# Patient Record
Sex: MALE | Race: WHITE | NOT HISPANIC OR LATINO
[De-identification: names, ages, dates, MRNs, and addresses within clinical notes are randomized per-mention and may not be internally consistent; named-entity substitution may affect disease eponyms.]

---

## 2019-12-31 ENCOUNTER — TRANSCRIPTION ENCOUNTER (OUTPATIENT)
Age: 54
End: 2019-12-31

## 2020-03-18 ENCOUNTER — TRANSCRIPTION ENCOUNTER (OUTPATIENT)
Age: 55
End: 2020-03-18

## 2020-03-20 ENCOUNTER — EMERGENCY (EMERGENCY)
Facility: HOSPITAL | Age: 55
LOS: 0 days | Discharge: HOME | End: 2020-03-21
Attending: EMERGENCY MEDICINE | Admitting: EMERGENCY MEDICINE
Payer: COMMERCIAL

## 2020-03-20 VITALS
HEART RATE: 70 BPM | OXYGEN SATURATION: 99 % | SYSTOLIC BLOOD PRESSURE: 115 MMHG | RESPIRATION RATE: 18 BRPM | DIASTOLIC BLOOD PRESSURE: 75 MMHG

## 2020-03-20 VITALS
DIASTOLIC BLOOD PRESSURE: 66 MMHG | RESPIRATION RATE: 18 BRPM | WEIGHT: 190.04 LBS | HEART RATE: 150 BPM | SYSTOLIC BLOOD PRESSURE: 125 MMHG | OXYGEN SATURATION: 99 % | TEMPERATURE: 96 F

## 2020-03-20 DIAGNOSIS — I48.91 UNSPECIFIED ATRIAL FIBRILLATION: ICD-10-CM

## 2020-03-20 DIAGNOSIS — R00.2 PALPITATIONS: ICD-10-CM

## 2020-03-20 DIAGNOSIS — Z88.8 ALLERGY STATUS TO OTHER DRUGS, MEDICAMENTS AND BIOLOGICAL SUBSTANCES STATUS: ICD-10-CM

## 2020-03-20 LAB
ALBUMIN SERPL ELPH-MCNC: 4.4 G/DL — SIGNIFICANT CHANGE UP (ref 3.5–5.2)
ALP SERPL-CCNC: 44 U/L — SIGNIFICANT CHANGE UP (ref 30–115)
ALT FLD-CCNC: 14 U/L — SIGNIFICANT CHANGE UP (ref 0–41)
ANION GAP SERPL CALC-SCNC: 13 MMOL/L — SIGNIFICANT CHANGE UP (ref 7–14)
AST SERPL-CCNC: 17 U/L — SIGNIFICANT CHANGE UP (ref 0–41)
BILIRUB SERPL-MCNC: 1 MG/DL — SIGNIFICANT CHANGE UP (ref 0.2–1.2)
BUN SERPL-MCNC: 15 MG/DL — SIGNIFICANT CHANGE UP (ref 10–20)
CALCIUM SERPL-MCNC: 9.8 MG/DL — SIGNIFICANT CHANGE UP (ref 8.5–10.1)
CHLORIDE SERPL-SCNC: 105 MMOL/L — SIGNIFICANT CHANGE UP (ref 98–110)
CO2 SERPL-SCNC: 25 MMOL/L — SIGNIFICANT CHANGE UP (ref 17–32)
CREAT SERPL-MCNC: 1.2 MG/DL — SIGNIFICANT CHANGE UP (ref 0.7–1.5)
GLUCOSE SERPL-MCNC: 77 MG/DL — SIGNIFICANT CHANGE UP (ref 70–99)
HCT VFR BLD CALC: 44.2 % — SIGNIFICANT CHANGE UP (ref 42–52)
HGB BLD-MCNC: 15.8 G/DL — SIGNIFICANT CHANGE UP (ref 14–18)
MCHC RBC-ENTMCNC: 32 PG — HIGH (ref 27–31)
MCHC RBC-ENTMCNC: 35.7 G/DL — SIGNIFICANT CHANGE UP (ref 32–37)
MCV RBC AUTO: 89.7 FL — SIGNIFICANT CHANGE UP (ref 80–94)
NRBC # BLD: 0 /100 WBCS — SIGNIFICANT CHANGE UP (ref 0–0)
NT-PROBNP SERPL-SCNC: 54 PG/ML — SIGNIFICANT CHANGE UP (ref 0–300)
PLATELET # BLD AUTO: 276 K/UL — SIGNIFICANT CHANGE UP (ref 130–400)
POTASSIUM SERPL-MCNC: 3.8 MMOL/L — SIGNIFICANT CHANGE UP (ref 3.5–5)
POTASSIUM SERPL-SCNC: 3.8 MMOL/L — SIGNIFICANT CHANGE UP (ref 3.5–5)
PROT SERPL-MCNC: 6.8 G/DL — SIGNIFICANT CHANGE UP (ref 6–8)
RBC # BLD: 4.93 M/UL — SIGNIFICANT CHANGE UP (ref 4.7–6.1)
RBC # FLD: 12.9 % — SIGNIFICANT CHANGE UP (ref 11.5–14.5)
SODIUM SERPL-SCNC: 143 MMOL/L — SIGNIFICANT CHANGE UP (ref 135–146)
TROPONIN T SERPL-MCNC: <0.01 NG/ML — SIGNIFICANT CHANGE UP
WBC # BLD: 6.23 K/UL — SIGNIFICANT CHANGE UP (ref 4.8–10.8)
WBC # FLD AUTO: 6.23 K/UL — SIGNIFICANT CHANGE UP (ref 4.8–10.8)

## 2020-03-20 PROCEDURE — 99285 EMERGENCY DEPT VISIT HI MDM: CPT

## 2020-03-20 PROCEDURE — 93010 ELECTROCARDIOGRAM REPORT: CPT | Mod: 76

## 2020-03-20 RX ORDER — PROCAINAMIDE HCL 500 MG
1000 TABLET, EXTENDED RELEASE ORAL ONCE
Refills: 0 | Status: COMPLETED | OUTPATIENT
Start: 2020-03-20 | End: 2020-03-20

## 2020-03-20 RX ORDER — APIXABAN 2.5 MG/1
1 TABLET, FILM COATED ORAL
Qty: 28 | Refills: 0
Start: 2020-03-20 | End: 2020-04-02

## 2020-03-20 RX ORDER — MAGNESIUM SULFATE 500 MG/ML
2 VIAL (ML) INJECTION ONCE
Refills: 0 | Status: COMPLETED | OUTPATIENT
Start: 2020-03-20 | End: 2020-03-20

## 2020-03-20 RX ORDER — SODIUM CHLORIDE 9 MG/ML
1000 INJECTION, SOLUTION INTRAVENOUS ONCE
Refills: 0 | Status: COMPLETED | OUTPATIENT
Start: 2020-03-20 | End: 2020-03-20

## 2020-03-20 RX ADMIN — Medication 520 MILLIGRAM(S): at 22:12

## 2020-03-20 RX ADMIN — Medication 2 GRAM(S): at 23:07

## 2020-03-20 RX ADMIN — Medication 50 GRAM(S): at 22:45

## 2020-03-20 RX ADMIN — Medication 1000 MILLIGRAM(S): at 22:45

## 2020-03-20 RX ADMIN — SODIUM CHLORIDE 1000 MILLILITER(S): 9 INJECTION, SOLUTION INTRAVENOUS at 22:29

## 2020-03-20 NOTE — ED PROVIDER NOTE - OBJECTIVE STATEMENT
55 y M no pmh pw palpitations. Sudden onset palpitations about 2 hours prior to arrival. Associated with lightheadedness. No hx of palpitations, not on blood thinners. Denies cp, sob, abd pain, back pain, dizziness.

## 2020-03-20 NOTE — ED PROVIDER NOTE - PHYSICAL EXAMINATION
CONSTITUTIONAL: Well-developed; well-nourished; in no acute distress.   SKIN: warm, dry  HEAD: Normocephalic; atraumatic.  EYES: PERRL, EOMI, normal sclera and conjunctiva   ENT: No nasal discharge; airway clear.  NECK: Supple; non tender.  CARD: S1, S2 normal; no murmurs, gallops, or rubs. Irregular, tachycardic.   RESP: No wheezes, rales or rhonchi.  ABD: soft ntnd  EXT: Normal ROM.  No clubbing, cyanosis or edema.   LYMPH: No acute cervical adenopathy.  NEURO: Alert, oriented, grossly unremarkable  PSYCH: Cooperative, appropriate.

## 2020-03-20 NOTE — ED PROVIDER NOTE - PATIENT PORTAL LINK FT
You can access the FollowMyHealth Patient Portal offered by [facility] by registering at the following website: [email or web address]. By joining [employer]’s FollowMyHealth portal, you will also be able to view your health information using other applications (apps) compatible with our system.

## 2020-03-20 NOTE — ED PROVIDER NOTE - ATTENDING CONTRIBUTION TO CARE
55 M to ED with new onset Afib  at 9pm tonight, noted to have rapid HR  h/o HTN and sleep apnea w/ cpap  intermittent palpitations and this pm persistent sx so to ED.  AVSS, exam as noted, CTAB, IRRR, abdomen soft NTND, (+)

## 2020-03-20 NOTE — ED PROVIDER NOTE - CARE PROVIDER_API CALL
[employer]; YOLIS)  Cardiac Electrophysiology  [address] [place] [address]  Phone: [phone]  Fax: [phone]  Follow Up Time:

## 2020-03-21 PROCEDURE — 71045 X-RAY EXAM CHEST 1 VIEW: CPT | Mod: 26

## 2020-03-24 PROBLEM — Z00.00 ENCOUNTER FOR PREVENTIVE HEALTH EXAMINATION: Status: ACTIVE | Noted: 2020-03-24

## 2020-06-04 ENCOUNTER — APPOINTMENT (OUTPATIENT)
Dept: CARDIOLOGY | Facility: CLINIC | Age: 55
End: 2020-06-04
Payer: COMMERCIAL

## 2020-06-04 VITALS
HEIGHT: 69 IN | TEMPERATURE: 97.7 F | WEIGHT: 190 LBS | HEART RATE: 59 BPM | SYSTOLIC BLOOD PRESSURE: 120 MMHG | DIASTOLIC BLOOD PRESSURE: 79 MMHG | BODY MASS INDEX: 28.14 KG/M2

## 2020-06-04 PROCEDURE — 93000 ELECTROCARDIOGRAM COMPLETE: CPT

## 2020-06-04 PROCEDURE — 99204 OFFICE O/P NEW MOD 45 MIN: CPT

## 2020-06-04 NOTE — PHYSICAL EXAM
[General Appearance - Well Developed] : well developed [Normal Appearance] : normal appearance [General Appearance - Well Nourished] : well nourished [Well Groomed] : well groomed [No Deformities] : no deformities [Normal Conjunctiva] : the conjunctiva exhibited no abnormalities [General Appearance - In No Acute Distress] : no acute distress [Eyelids - No Xanthelasma] : the eyelids demonstrated no xanthelasmas [Murmurs] : no murmurs present [Heart Sounds] : normal S1 and S2 [Heart Rate And Rhythm] : heart rate and rhythm were normal [Respiration, Rhythm And Depth] : normal respiratory rhythm and effort [Exaggerated Use Of Accessory Muscles For Inspiration] : no accessory muscle use [Auscultation Breath Sounds / Voice Sounds] : lungs were clear to auscultation bilaterally [Nail Clubbing] : no clubbing of the fingernails [Cyanosis, Localized] : no localized cyanosis [Petechial Hemorrhages (___cm)] : no petechial hemorrhages [] : no ischemic changes

## 2020-06-04 NOTE — HISTORY OF PRESENT ILLNESS
[FreeTextEntry1] : Patient with history of FRANKY, HTM new onset PAF CHADVASC 1. Patient hope to episodes of palpitations recently. He also describes occasional palpitations in the past. Patient went to emergency room found to be in atrial fibrillation. He spontaneously converted to sinus rhythm and was placed on anticoagulation. Patient denies syncope chest pain nausea vomiting. Patient is using his mask for obstructive sleep. Patient of the office for further evaluation of his event.\par \par EKG SR n

## 2020-06-04 NOTE — ASSESSMENT
[FreeTextEntry1] : new onset AF\par \par - event monitor to assess AF burden\par - nuclear strees test\par - echo\par - cont AC for now. Discussed risk an befits\par - cont FRANKY mask

## 2020-06-19 ENCOUNTER — RESULT REVIEW (OUTPATIENT)
Age: 55
End: 2020-06-19

## 2020-06-19 ENCOUNTER — OUTPATIENT (OUTPATIENT)
Dept: OUTPATIENT SERVICES | Facility: HOSPITAL | Age: 55
LOS: 1 days | Discharge: HOME | End: 2020-06-19
Payer: COMMERCIAL

## 2020-06-19 DIAGNOSIS — I48.0 PAROXYSMAL ATRIAL FIBRILLATION: ICD-10-CM

## 2020-06-19 PROCEDURE — 78452 HT MUSCLE IMAGE SPECT MULT: CPT | Mod: 26

## 2020-07-17 LAB
T3FREE SERPL-MCNC: 2.69 PG/ML
T4 FREE SERPL-MCNC: 1.2 NG/DL
TSH SERPL-ACNC: 2.07 UIU/ML

## 2020-08-21 ENCOUNTER — APPOINTMENT (OUTPATIENT)
Dept: CARDIOLOGY | Facility: CLINIC | Age: 55
End: 2020-08-21
Payer: COMMERCIAL

## 2020-08-21 PROCEDURE — 93306 TTE W/DOPPLER COMPLETE: CPT

## 2020-09-04 ENCOUNTER — APPOINTMENT (OUTPATIENT)
Dept: CARDIOLOGY | Facility: CLINIC | Age: 55
End: 2020-09-04
Payer: COMMERCIAL

## 2020-09-04 VITALS
WEIGHT: 190 LBS | DIASTOLIC BLOOD PRESSURE: 91 MMHG | HEIGHT: 69 IN | SYSTOLIC BLOOD PRESSURE: 149 MMHG | TEMPERATURE: 97.4 F | BODY MASS INDEX: 28.14 KG/M2 | HEART RATE: 58 BPM

## 2020-09-04 DIAGNOSIS — Z78.9 OTHER SPECIFIED HEALTH STATUS: ICD-10-CM

## 2020-09-04 DIAGNOSIS — Z87.891 PERSONAL HISTORY OF NICOTINE DEPENDENCE: ICD-10-CM

## 2020-09-04 DIAGNOSIS — I10 ESSENTIAL (PRIMARY) HYPERTENSION: ICD-10-CM

## 2020-09-04 PROCEDURE — 99214 OFFICE O/P EST MOD 30 MIN: CPT

## 2020-09-04 PROCEDURE — 93000 ELECTROCARDIOGRAM COMPLETE: CPT

## 2020-09-04 RX ORDER — APIXABAN 5 MG/1
5 TABLET, FILM COATED ORAL
Qty: 180 | Refills: 3 | Status: DISCONTINUED | COMMUNITY
Start: 1900-01-01 | End: 2020-09-04

## 2020-09-04 NOTE — ASSESSMENT
[FreeTextEntry1] : New AF - no AF on event monior \par - all tests reviewed\par - normal labs\par - cont to monitor\par - CHADVSC 1; discussed with patients risk an befits. Pt want tot stop it\par - cont ASA

## 2020-09-04 NOTE — HISTORY OF PRESENT ILLNESS
[FreeTextEntry1] : Patient with history of FRANKY, HTM new onset PAF CHADVASC 1. Patient hope to episodes of palpitations recently. He also describes occasional palpitations in the past. Patient went to emergency room found to be in atrial fibrillation. He spontaneously converted to sinus rhythm and was placed on anticoagulation. Patient denies syncope chest pain nausea vomiting. Patient is using his mask for obstructive sleep. Patient of the office for further evaluation of his event.\par \par EKG SR 55 bpm\par event monitor- PAC and rare PVC\par echo EF 55%\par nuc - normal

## 2020-09-04 NOTE — PHYSICAL EXAM
[Normal Appearance] : normal appearance [General Appearance - Well Developed] : well developed [Well Groomed] : well groomed [General Appearance - Well Nourished] : well nourished [No Deformities] : no deformities [General Appearance - In No Acute Distress] : no acute distress [Eyelids - No Xanthelasma] : the eyelids demonstrated no xanthelasmas [Normal Conjunctiva] : the conjunctiva exhibited no abnormalities [Respiration, Rhythm And Depth] : normal respiratory rhythm and effort [Exaggerated Use Of Accessory Muscles For Inspiration] : no accessory muscle use [Auscultation Breath Sounds / Voice Sounds] : lungs were clear to auscultation bilaterally [Heart Rate And Rhythm] : heart rate and rhythm were normal [Murmurs] : no murmurs present [Heart Sounds] : normal S1 and S2 [Nail Clubbing] : no clubbing of the fingernails [Petechial Hemorrhages (___cm)] : no petechial hemorrhages [Cyanosis, Localized] : no localized cyanosis [] : no ischemic changes

## 2021-06-04 ENCOUNTER — APPOINTMENT (OUTPATIENT)
Dept: CARDIOLOGY | Facility: CLINIC | Age: 56
End: 2021-06-04

## 2021-07-02 ENCOUNTER — APPOINTMENT (OUTPATIENT)
Dept: CARDIOLOGY | Facility: CLINIC | Age: 56
End: 2021-07-02
Payer: COMMERCIAL

## 2021-07-02 VITALS — TEMPERATURE: 97.9 F | SYSTOLIC BLOOD PRESSURE: 134 MMHG | HEART RATE: 60 BPM | DIASTOLIC BLOOD PRESSURE: 84 MMHG

## 2021-07-02 DIAGNOSIS — I48.0 PAROXYSMAL ATRIAL FIBRILLATION: ICD-10-CM

## 2021-07-02 PROCEDURE — 99213 OFFICE O/P EST LOW 20 MIN: CPT

## 2021-07-02 PROCEDURE — 93000 ELECTROCARDIOGRAM COMPLETE: CPT

## 2021-07-02 NOTE — ASSESSMENT
[FreeTextEntry1] : New AF - no AF on event monitor no new epsides \par - cont to monitor\par - CHADVSC 1; discussed with patients risk an befits. Pt want tot stop it\par - cont ASA

## 2021-07-02 NOTE — PHYSICAL EXAM
[General Appearance - Well Developed] : well developed [Normal Appearance] : normal appearance [Well Groomed] : well groomed [General Appearance - Well Nourished] : well nourished [No Deformities] : no deformities [General Appearance - In No Acute Distress] : no acute distress [Normal Conjunctiva] : the conjunctiva exhibited no abnormalities [Eyelids - No Xanthelasma] : the eyelids demonstrated no xanthelasmas [Respiration, Rhythm And Depth] : normal respiratory rhythm and effort [Auscultation Breath Sounds / Voice Sounds] : lungs were clear to auscultation bilaterally [Exaggerated Use Of Accessory Muscles For Inspiration] : no accessory muscle use [Heart Rate And Rhythm] : heart rate and rhythm were normal [Heart Sounds] : normal S1 and S2 [Murmurs] : no murmurs present [Nail Clubbing] : no clubbing of the fingernails [Petechial Hemorrhages (___cm)] : no petechial hemorrhages [Cyanosis, Localized] : no localized cyanosis [] : no ischemic changes

## 2021-07-02 NOTE — HISTORY OF PRESENT ILLNESS
[FreeTextEntry1] : Patient with history of FRANKY, HTM new onset PAF CHADVASC 1. Patient hope to episodes of palpitations recently. He also describes occasional palpitations in the past. Patient went to emergency room found to be in atrial fibrillation. He spontaneously converted to sinus rhythm and was placed on anticoagulation. Patient denies syncope chest pain nausea vomiting. Patient is using his mask for obstructive sleep. Patient of the office for further evaluation of his event.\par \par No palpitations, pt is exercising. \par \par EKG SR 60 bpm\par event monitor- PAC and rare PVC\par echo EF 55%\par nuc - normal

## 2021-10-06 ENCOUNTER — TRANSCRIPTION ENCOUNTER (OUTPATIENT)
Age: 56
End: 2021-10-06

## 2021-10-06 PROBLEM — I10 ESSENTIAL HYPERTENSION: Status: ACTIVE | Noted: 2020-09-04

## 2021-12-10 ENCOUNTER — TRANSCRIPTION ENCOUNTER (OUTPATIENT)
Age: 56
End: 2021-12-10

## 2021-12-19 ENCOUNTER — TRANSCRIPTION ENCOUNTER (OUTPATIENT)
Age: 56
End: 2021-12-19

## 2022-03-02 ENCOUNTER — APPOINTMENT (OUTPATIENT)
Dept: GASTROENTEROLOGY | Facility: CLINIC | Age: 57
End: 2022-03-02
Payer: COMMERCIAL

## 2022-03-02 DIAGNOSIS — K63.5 POLYP OF COLON: ICD-10-CM

## 2022-03-02 PROCEDURE — 99443: CPT

## 2022-03-02 NOTE — HISTORY OF PRESENT ILLNESS
[Home] : at home, [unfilled] , at the time of the visit. [Medical Office: ([facility])___] : at the medical office located in  [Verbal consent obtained from patient] : the patient, [unfilled] [FreeTextEntry4] : Cassidy [de-identified] : Patient  is a 57-year-old gentleman with past medical history of hypertension, prior ERCP for cholangitis done in 2014, and prior hyperplastic polyps who presents for set up for colonoscopy.  Patient currently has been feeling well but has been due for colonoscopy since 2020.  Patient does take baby aspirin once a day.

## 2022-03-02 NOTE — ASSESSMENT
[FreeTextEntry1] : Patient  is a 57-year-old gentleman with past medical history of hypertension, prior ERCP for cholangitis done in 2014, and prior hyperplastic polyps who presents for set up for colonoscopy.  Patient currently has been feeling well but has been due for colonoscopy since 2020.  Patient does take baby aspirin once a day. will setup for colonoscopy. \par \par Prior Hyperplastic Polyp \par - Setup for colonoscopy\par - Prep sent

## 2022-04-08 ENCOUNTER — LABORATORY RESULT (OUTPATIENT)
Age: 57
End: 2022-04-08

## 2022-04-11 ENCOUNTER — TRANSCRIPTION ENCOUNTER (OUTPATIENT)
Age: 57
End: 2022-04-11

## 2022-04-11 ENCOUNTER — OUTPATIENT (OUTPATIENT)
Dept: OUTPATIENT SERVICES | Facility: HOSPITAL | Age: 57
LOS: 1 days | Discharge: HOME | End: 2022-04-11
Payer: COMMERCIAL

## 2022-04-11 ENCOUNTER — RESULT REVIEW (OUTPATIENT)
Age: 57
End: 2022-04-11

## 2022-04-11 VITALS
OXYGEN SATURATION: 100 % | HEART RATE: 73 BPM | DIASTOLIC BLOOD PRESSURE: 70 MMHG | RESPIRATION RATE: 17 BRPM | SYSTOLIC BLOOD PRESSURE: 121 MMHG

## 2022-04-11 VITALS
DIASTOLIC BLOOD PRESSURE: 76 MMHG | RESPIRATION RATE: 18 BRPM | SYSTOLIC BLOOD PRESSURE: 132 MMHG | HEART RATE: 60 BPM | HEIGHT: 69 IN | TEMPERATURE: 97 F | WEIGHT: 199.96 LBS

## 2022-04-11 DIAGNOSIS — Z90.49 ACQUIRED ABSENCE OF OTHER SPECIFIED PARTS OF DIGESTIVE TRACT: Chronic | ICD-10-CM

## 2022-04-11 DIAGNOSIS — I48.91 UNSPECIFIED ATRIAL FIBRILLATION: Chronic | ICD-10-CM

## 2022-04-11 DIAGNOSIS — Z87.828 PERSONAL HISTORY OF OTHER (HEALED) PHYSICAL INJURY AND TRAUMA: Chronic | ICD-10-CM

## 2022-04-11 PROCEDURE — 45381 COLONOSCOPY SUBMUCOUS NJX: CPT | Mod: XU

## 2022-04-11 PROCEDURE — 45390 COLONOSCOPY W/RESECTION: CPT

## 2022-04-11 PROCEDURE — 45382 COLONOSCOPY W/CONTROL BLEED: CPT | Mod: XU

## 2022-04-11 PROCEDURE — 88305 TISSUE EXAM BY PATHOLOGIST: CPT | Mod: 26

## 2022-04-11 RX ORDER — RAMIPRIL 5 MG
0 CAPSULE ORAL
Qty: 0 | Refills: 0 | DISCHARGE

## 2022-04-11 RX ORDER — OFLOXACIN 0.3 %
1 DROPS OPHTHALMIC (EYE) EVERY 6 HOURS
Refills: 0 | Status: DISCONTINUED | OUTPATIENT
Start: 2022-04-11 | End: 2022-04-25

## 2022-04-11 RX ORDER — ASPIRIN/CALCIUM CARB/MAGNESIUM 324 MG
0 TABLET ORAL
Qty: 0 | Refills: 0 | DISCHARGE

## 2022-04-11 NOTE — CHART NOTE - NSCHARTNOTEFT_GEN_A_CORE
PACU ANESTHESIA ADMISSION NOTE      Procedure:   Post op diagnosis:      ____  Intubated  TV:______       Rate: ______      FiO2: ______    ____  Patent Airway    ____  Full return of protective reflexes    ____  Full recovery from anesthesia / back to baseline status    Vitals:  temp(F) 97  /70  spo2 98  RR 18  pulse 70    Mental Status:  __x __ Awake   _____ Alert   _____ Drowsy   _____ Sedated    Nausea/Vomiting:  ___x _ NO  ______Yes,   See Post - Op Orders          Pain Scale (0-10):  _____    Treatment: ____ None    x ____ See Post - Op/PCA Orders    Post - Operative Fluids:   ____ Oral   ___x _ See Post - Op Orders    Plan: Discharge:   __x __Home       _____Floor     _____Critical Care    _____  Other:_________________    Comments: uneventful anesthesia course no complications. Vitals stable. Pt transferred to PACU

## 2022-04-11 NOTE — ASU DISCHARGE PLAN (ADULT/PEDIATRIC) - CARE PROVIDER_API CALL
Thien Patel)  Gastroenterology; Internal Medicine  [address] [place] [address]  Phone: [phone]  Fax: [phone]  Follow Up Time:

## 2022-04-11 NOTE — ASU DISCHARGE PLAN (ADULT/PEDIATRIC) - CALL YOUR DOCTOR IF YOU HAVE ANY OF THE FOLLOWING:
Bleeding that does not stop/Pain not relieved by Medications Bleeding that does not stop/Pain not relieved by Medications/Fever greater than (need to indicate Fahrenheit or Celsius)/Nausea and vomiting that does not stop/Inability to tolerate liquids or foods

## 2022-04-11 NOTE — CHART NOTE - NSCHARTNOTEFT_GEN_A_CORE
After procedure patient reported foreign body sensation in left eye. On exam eye is red. Normal vision and EOM intact. Eye irrigated with normal saline with improvement in discomfort. Discomfort did not completely resolve. Corneal abrasion protocol instituted. 1 drop  of tetracaine in left eye led to complete resolution. 1 drop of ofloxacin in left eye. Pt was given ofloxacin eye drops and instructed to use them for two more doses 6 hrs apart. Instructed to follow up with opthalmology tomorrow if pain does not resolve or if any visual issues arise.

## 2022-04-11 NOTE — ASU DISCHARGE PLAN (ADULT/PEDIATRIC) - NS MD DC FALL RISK RISK
For information on Fall & Injury Prevention, visit: [email or web address] OR  [email or web address] OR  https://www.cdc.gov/steadi/patient.html

## 2022-04-11 NOTE — ASU DISCHARGE PLAN (ADULT/PEDIATRIC) - ASU DC SPECIAL INSTRUCTIONSFT
-liquid diet only for today  -antibiotics ciprofloxacin and flagyl for 5 days  -no not take antiinflammatory medications like advil, alleve, proxen. if pain like headache, take tylenol  -if severe abdominal pain come to emergency room

## 2022-04-13 LAB — SURGICAL PATHOLOGY STUDY: SIGNIFICANT CHANGE UP

## 2022-04-15 DIAGNOSIS — Z79.01 LONG TERM (CURRENT) USE OF ANTICOAGULANTS: ICD-10-CM

## 2022-04-15 DIAGNOSIS — I10 ESSENTIAL (PRIMARY) HYPERTENSION: ICD-10-CM

## 2022-04-15 DIAGNOSIS — D12.3 BENIGN NEOPLASM OF TRANSVERSE COLON: ICD-10-CM

## 2022-04-15 DIAGNOSIS — Z86.010 PERSONAL HISTORY OF COLONIC POLYPS: ICD-10-CM

## 2022-04-15 DIAGNOSIS — Z09 ENCOUNTER FOR FOLLOW-UP EXAMINATION AFTER COMPLETED TREATMENT FOR CONDITIONS OTHER THAN MALIGNANT NEOPLASM: ICD-10-CM

## 2022-04-15 DIAGNOSIS — Z90.49 ACQUIRED ABSENCE OF OTHER SPECIFIED PARTS OF DIGESTIVE TRACT: ICD-10-CM

## 2022-04-15 DIAGNOSIS — D12.2 BENIGN NEOPLASM OF ASCENDING COLON: ICD-10-CM

## 2022-04-15 DIAGNOSIS — I48.91 UNSPECIFIED ATRIAL FIBRILLATION: ICD-10-CM

## 2022-04-20 ENCOUNTER — APPOINTMENT (OUTPATIENT)
Dept: GASTROENTEROLOGY | Facility: CLINIC | Age: 57
End: 2022-04-20
Payer: COMMERCIAL

## 2022-04-20 PROCEDURE — 99443: CPT

## 2022-04-20 NOTE — ASSESSMENT
[FreeTextEntry1] : Patient  is a 57-year-old gentleman with past medical history of hypertension, prior ERCP for cholangitis done in 2014, and prior hyperplastic polyps who presents after Colonoscopy in which TVAs were removed. Overall feeling well. \par \par Tubullovillous Adenoma s/p EMR\par - Discussed procedure and pathology in detail with patient \par - Patient feels well after procedure\par - Will need repeat Colonoscopy in 6 months

## 2022-04-20 NOTE — HISTORY OF PRESENT ILLNESS
[Home] : at home, [unfilled] , at the time of the visit. [Medical Office: ([facility])___] : at the medical office located in  [Verbal consent obtained from patient] : the patient, [unfilled] [___ Month(s) Ago] : [unfilled] month(s) ago [_________] : Performed [unfilled] [FreeTextEntry4] : Cecilio [de-identified] : Patient  is a 57-year-old gentleman with past medical history of hypertension, prior ERCP for cholangitis done in 2014, and prior hyperplastic polyps who presents after Colonoscopy in which TVAs were removed. Overall feeling well.

## 2022-05-04 ENCOUNTER — APPOINTMENT (OUTPATIENT)
Dept: GASTROENTEROLOGY | Facility: CLINIC | Age: 57
End: 2022-05-04

## 2022-07-13 PROBLEM — I10 ESSENTIAL (PRIMARY) HYPERTENSION: Chronic | Status: ACTIVE | Noted: 2022-04-11

## 2022-07-13 PROBLEM — I48.91 UNSPECIFIED ATRIAL FIBRILLATION: Chronic | Status: ACTIVE | Noted: 2022-04-11

## 2022-07-15 ENCOUNTER — APPOINTMENT (OUTPATIENT)
Dept: CARDIOLOGY | Facility: CLINIC | Age: 57
End: 2022-07-15

## 2022-07-15 VITALS
HEIGHT: 69 IN | HEART RATE: 60 BPM | DIASTOLIC BLOOD PRESSURE: 73 MMHG | TEMPERATURE: 97.4 F | WEIGHT: 199 LBS | SYSTOLIC BLOOD PRESSURE: 116 MMHG | BODY MASS INDEX: 29.47 KG/M2

## 2022-07-15 PROCEDURE — 99213 OFFICE O/P EST LOW 20 MIN: CPT | Mod: 25

## 2022-07-15 PROCEDURE — 93000 ELECTROCARDIOGRAM COMPLETE: CPT

## 2022-07-15 RX ORDER — SODIUM SULFATE, POTASSIUM SULFATE, MAGNESIUM SULFATE 17.5; 3.13; 1.6 G/ML; G/ML; G/ML
17.5-3.13-1.6 SOLUTION, CONCENTRATE ORAL
Qty: 1 | Refills: 0 | Status: COMPLETED | COMMUNITY
Start: 2022-03-02 | End: 2022-07-15

## 2022-07-15 RX ORDER — CIPROFLOXACIN HYDROCHLORIDE 500 MG/1
500 TABLET, FILM COATED ORAL
Qty: 6 | Refills: 0 | Status: COMPLETED | COMMUNITY
Start: 2022-04-11 | End: 2022-07-15

## 2022-07-15 RX ORDER — METRONIDAZOLE 500 MG/1
500 TABLET ORAL
Qty: 9 | Refills: 0 | Status: COMPLETED | COMMUNITY
Start: 2022-04-11 | End: 2022-07-15

## 2022-07-15 RX ORDER — RAMIPRIL 5 MG/1
5 CAPSULE ORAL DAILY
Refills: 0 | Status: ACTIVE | COMMUNITY

## 2022-07-15 RX ORDER — ASPIRIN 81 MG
81 TABLET, DELAYED RELEASE (ENTERIC COATED) ORAL DAILY
Refills: 0 | Status: ACTIVE | COMMUNITY

## 2022-07-15 RX ORDER — SODIUM SULFATE, POTASSIUM SULFATE, MAGNESIUM SULFATE 17.5; 3.13; 1.6 G/ML; G/ML; G/ML
17.5-3.13-1.6 SOLUTION, CONCENTRATE ORAL
Qty: 1 | Refills: 0 | Status: COMPLETED | COMMUNITY
Start: 2022-04-20 | End: 2022-07-15

## 2022-07-15 NOTE — ASSESSMENT
[FreeTextEntry1] : ## paroxysmal atrial fibrillation \par \par - 1 episode in 03/20. No further episodes\par - CHADSVASC 1 (HTN). On ASA. Not on OAC. See prior discussions.\par - Clinical observation for now\par - Return as needed

## 2022-07-15 NOTE — PHYSICAL EXAM
[General Appearance - Well Developed] : well developed [Normal Appearance] : normal appearance [Well Groomed] : well groomed [General Appearance - Well Nourished] : well nourished [No Deformities] : no deformities [General Appearance - In No Acute Distress] : no acute distress [Normal Conjunctiva] : the conjunctiva exhibited no abnormalities [Eyelids - No Xanthelasma] : the eyelids demonstrated no xanthelasmas [Respiration, Rhythm And Depth] : normal respiratory rhythm and effort [Exaggerated Use Of Accessory Muscles For Inspiration] : no accessory muscle use [Auscultation Breath Sounds / Voice Sounds] : lungs were clear to auscultation bilaterally [Heart Rate And Rhythm] : heart rate and rhythm were normal [Heart Sounds] : normal S1 and S2 [Murmurs] : no murmurs present [Nail Clubbing] : no clubbing of the fingernails [Cyanosis, Localized] : no localized cyanosis [Petechial Hemorrhages (___cm)] : no petechial hemorrhages [] : no ischemic changes

## 2022-07-15 NOTE — HISTORY OF PRESENT ILLNESS
[FreeTextEntry1] : Patient with history of FRANKY, HTM new onset PAF CHADVASC 1. Patient hope to episodes of palpitations recently. He also describes occasional palpitations in the past. Patient went to emergency room found to be in atrial fibrillation. He spontaneously converted to sinus rhythm and was placed on anticoagulation. Patient denies syncope chest pain nausea vomiting. Patient is using his mask for obstructive sleep. Patient of the office for further evaluation of his event.\par \par No palpitations, pt is exercising. \par \par EKG SR 60 bpm\par event monitor- PAC and rare PVC\par echo EF 55%\par nuc - normal\par \par 7/15: Feels fine. No further episodes of Palpitations or AF.\par \par EKG (07/15/22): SR 60\par

## 2023-03-05 ENCOUNTER — NON-APPOINTMENT (OUTPATIENT)
Age: 58
End: 2023-03-05

## 2023-06-09 ENCOUNTER — RESULT REVIEW (OUTPATIENT)
Age: 58
End: 2023-06-09

## 2023-06-09 ENCOUNTER — OUTPATIENT (OUTPATIENT)
Dept: INPATIENT UNIT | Facility: HOSPITAL | Age: 58
LOS: 1 days | Discharge: HOME | End: 2023-06-09
Payer: COMMERCIAL

## 2023-06-09 ENCOUNTER — TRANSCRIPTION ENCOUNTER (OUTPATIENT)
Age: 58
End: 2023-06-09

## 2023-06-09 VITALS
WEIGHT: 199.96 LBS | HEIGHT: 69 IN | HEART RATE: 57 BPM | DIASTOLIC BLOOD PRESSURE: 63 MMHG | TEMPERATURE: 97 F | RESPIRATION RATE: 18 BRPM | SYSTOLIC BLOOD PRESSURE: 127 MMHG

## 2023-06-09 VITALS
OXYGEN SATURATION: 98 % | SYSTOLIC BLOOD PRESSURE: 119 MMHG | HEART RATE: 52 BPM | DIASTOLIC BLOOD PRESSURE: 68 MMHG | RESPIRATION RATE: 20 BRPM

## 2023-06-09 DIAGNOSIS — R10.9 UNSPECIFIED ABDOMINAL PAIN: ICD-10-CM

## 2023-06-09 DIAGNOSIS — Z90.49 ACQUIRED ABSENCE OF OTHER SPECIFIED PARTS OF DIGESTIVE TRACT: Chronic | ICD-10-CM

## 2023-06-09 DIAGNOSIS — Z87.828 PERSONAL HISTORY OF OTHER (HEALED) PHYSICAL INJURY AND TRAUMA: Chronic | ICD-10-CM

## 2023-06-09 PROCEDURE — 88305 TISSUE EXAM BY PATHOLOGIST: CPT | Mod: 26

## 2023-06-09 PROCEDURE — 45385 COLONOSCOPY W/LESION REMOVAL: CPT

## 2023-06-09 PROCEDURE — 45381 COLONOSCOPY SUBMUCOUS NJX: CPT | Mod: XU

## 2023-06-09 PROCEDURE — 88305 TISSUE EXAM BY PATHOLOGIST: CPT

## 2023-06-09 PROCEDURE — C1889: CPT

## 2023-06-09 NOTE — H&P PST ADULT - HISTORY OF PRESENT ILLNESS
57-year-old gentleman with past medical history of hypertension, prior ERCP for cholangitis done in 2014, and prior hyperplastic polyps who presents after Colonoscopy in which TVAs s/p EMR here for surveillance

## 2023-06-09 NOTE — ASU PATIENT PROFILE, ADULT - NSICDXPASTMEDICALHX_GEN_ALL_CORE_FT
PAST MEDICAL HISTORY:  Afib     HTN (hypertension)      PAST MEDICAL HISTORY:  Afib     HTN (hypertension)     Sleep apnea c-pap

## 2023-06-09 NOTE — ASU DISCHARGE PLAN (ADULT/PEDIATRIC) - NS MD DC FALL RISK RISK
For information on Fall & Injury Prevention, visit: [email or web address] OR  [email or web address] OR  https://www.cdc.gov/steadi/patient.html

## 2023-06-09 NOTE — ASU DISCHARGE PLAN (ADULT/PEDIATRIC) - CALL YOUR DOCTOR IF YOU HAVE ANY OF THE FOLLOWING:
Bleeding that does not stop/Fever greater than (need to indicate Fahrenheit or Celsius)/Numbness, tingling, color or temperature change to extremity/Nausea and vomiting that does not stop/Excessive diarrhea/Inability to tolerate liquids or foods

## 2023-06-09 NOTE — ASU PATIENT PROFILE, ADULT - NSICDXPASTSURGICALHX_GEN_ALL_CORE_FT
PAST SURGICAL HISTORY:  History of laparoscopic cholecystectomy     History of tear of meniscus of knee joint

## 2023-06-09 NOTE — ASU DISCHARGE PLAN (ADULT/PEDIATRIC) - CARE PROVIDER_API CALL
Thien Patel  Gastroenterology  [address] [place] [address]  Phone: [phone]  Fax: [phone]  Established Patient  Follow Up Time: Routine

## 2023-06-09 NOTE — CHART NOTE - NSCHARTNOTEFT_GEN_A_CORE
PACU ANESTHESIA ADMISSION NOTE      Procedure: Colonoscopy  Post op diagnosis:      ____  Intubated  TV:______       Rate: ______      FiO2: ______    __x__  Patent Airway    ___x_  Full return of protective reflexes    ____  Full recovery from anesthesia / back to baseline     Vitals:   T:  97         R:   14               BP:  99/58                Sat:  98%                 P: 63      Mental Status:  __x__ Awake   ___x__ Alert   _____ Drowsy   _____ Sedated    Nausea/Vomiting:  __x__ NO  ______Yes,   See Post - Op Orders          Pain Scale (0-10):  __0___    Treatment: ____ None    ____ See Post - Op/PCA Orders    Post - Operative Fluids:   ____ Oral   __x__ See Post - Op Orders    Plan: Discharge:   __x__Home       _____Floor     _____Critical Care    _____  Other:_________________    Comments: patient hemodynamically stable. Handoff endorsed to PACU RN. No anesthesia related issues present. To be discharged home when criteria met

## 2023-06-12 LAB — SURGICAL PATHOLOGY STUDY: SIGNIFICANT CHANGE UP

## 2023-06-16 DIAGNOSIS — D12.3 BENIGN NEOPLASM OF TRANSVERSE COLON: ICD-10-CM

## 2023-06-16 DIAGNOSIS — K64.8 OTHER HEMORRHOIDS: ICD-10-CM

## 2023-06-16 DIAGNOSIS — G47.33 OBSTRUCTIVE SLEEP APNEA (ADULT) (PEDIATRIC): ICD-10-CM

## 2023-06-16 DIAGNOSIS — Z86.010 PERSONAL HISTORY OF COLONIC POLYPS: ICD-10-CM

## 2023-06-16 DIAGNOSIS — Z99.89 DEPENDENCE ON OTHER ENABLING MACHINES AND DEVICES: ICD-10-CM

## 2023-06-16 DIAGNOSIS — Z09 ENCOUNTER FOR FOLLOW-UP EXAMINATION AFTER COMPLETED TREATMENT FOR CONDITIONS OTHER THAN MALIGNANT NEOPLASM: ICD-10-CM

## 2023-06-16 DIAGNOSIS — I10 ESSENTIAL (PRIMARY) HYPERTENSION: ICD-10-CM

## 2023-06-16 DIAGNOSIS — Z88.6 ALLERGY STATUS TO ANALGESIC AGENT: ICD-10-CM

## 2023-06-16 DIAGNOSIS — I48.91 UNSPECIFIED ATRIAL FIBRILLATION: ICD-10-CM

## 2023-06-16 DIAGNOSIS — D12.4 BENIGN NEOPLASM OF DESCENDING COLON: ICD-10-CM

## 2023-06-21 ENCOUNTER — TRANSCRIPTION ENCOUNTER (OUTPATIENT)
Age: 58
End: 2023-06-21

## 2023-06-21 ENCOUNTER — APPOINTMENT (OUTPATIENT)
Dept: GASTROENTEROLOGY | Facility: CLINIC | Age: 58
End: 2023-06-21
Payer: COMMERCIAL

## 2023-06-21 DIAGNOSIS — K57.90 DIVERTICULOSIS OF INTESTINE, PART UNSPECIFIED, W/OUT PERFORATION OR ABSCESS W/OUT BLEEDING: ICD-10-CM

## 2023-06-21 DIAGNOSIS — D12.6 BENIGN NEOPLASM OF COLON, UNSPECIFIED: ICD-10-CM

## 2023-06-21 PROCEDURE — 99214 OFFICE O/P EST MOD 30 MIN: CPT | Mod: 95

## 2023-06-21 NOTE — ASSESSMENT
[FreeTextEntry1] : Patient  is a 58-year-old gentleman with past medical history of hypertension, prior ERCP for cholangitis done in 2014, and prior hyperplastic polyps who presents after Colonoscopy in which TVAs were removed. Overall feeling well. Patient s/p colonoscopy for surveillance with removal of 8mm polyp in the cecum, 15mm Hepatic Flexure, 3mm in the Cecum, a 5mm in the ascending, and a 3.5cm Polypoid lesion in the ascending. Patient tolerated procedure well. Patient had TVA as well as TA. Feels well. \par \par Multiple Polyps removed\par - Discussed procedure and pathology in detail with patient \par - Patient feels well after procedure\par - Will need Follow up in 1 year to setup Colonoscopy\par - Needs CRC before three years given polyp formation

## 2023-06-21 NOTE — HISTORY OF PRESENT ILLNESS
[Home] : at home, [unfilled] , at the time of the visit. [Medical Office: ([facility])___] : at the medical office located in  [Verbal consent obtained from patient] : the patient, [unfilled] [___ Month(s) Ago] : [unfilled] month(s) ago [_________] : Performed [unfilled] [FreeTextEntry4] : Cassidy [FreeTextEntry1] : 6/9/23 [de-identified] : Patient  is a 58-year-old gentleman with past medical history of hypertension, prior ERCP for cholangitis done in 2014, and prior hyperplastic polyps who presents after Colonoscopy in which TVAs were removed. Overall feeling well. Patient s/p colonoscopy for surveillance with removal of 8mm polyp in the cecum, 15mm Hepatic Flexure, 3mm in the Cecum, a 5mm in the ascending, and a 3.5cm Polypoid lesion in the ascending. Patient tolerated procedure well. Patient had TVA as well as TA. Feels well.

## 2024-03-06 NOTE — ASU DISCHARGE PLAN (ADULT/PEDIATRIC) - "IF YOU OR YOUR GUARDIAN/FAMILY IS A SMOKER, IT IS IMPORTANT FOR YOUR HEALTH TO STOP SMOKING. PLEASE BE AWARE THAT SECOND HAND SMOKE IS ALSO HARMFUL."
Medication: LOSARTAN POTASSIUM 50 MG TAB  passed protocol.   Last office visit date: 03/24/23,upcoming 03/26/24  Next appointment scheduled?: Yes   Number of refills given: #90 with 1 refills    Statement Selected

## 2024-05-21 NOTE — ASU PATIENT PROFILE, ADULT - PATIENT KNOW
HbA1C 8.9 on 4/6/24  Home:  Basaglar 30U qhs/Metformin  Here:  Lantus 18U qhs/Metformin 500mg BID  Continue with accuchecks/SSI/DM diet     no

## 2024-05-29 NOTE — ASU DISCHARGE PLAN (ADULT/PEDIATRIC) - WAS YOUR LAST COVID-19 VACCINE GREATER THAN OR EQUAL TO TWO MONTHS AGO?
[facility] and was w/pt performing a wellness visit and stated that pt was experiencing fluctuation in his oxygen levels and was generally not feeling well. Adia requested that pt's scheduled TCM appt be moved up due to pt needing abx for chronic cough. Pt's TCM appt rescheduled for today 5/29 with PCP.   [de-identified] : loop [de-identified] : one AT episode. Pt was moving furniture  Yes
